# Patient Record
Sex: MALE | Race: WHITE | URBAN - METROPOLITAN AREA
[De-identification: names, ages, dates, MRNs, and addresses within clinical notes are randomized per-mention and may not be internally consistent; named-entity substitution may affect disease eponyms.]

---

## 2021-08-28 ENCOUNTER — TELEPHONE ENCOUNTER (OUTPATIENT)
Dept: URBAN - METROPOLITAN AREA CLINIC 13 | Facility: CLINIC | Age: 76
End: 2021-08-28

## 2021-08-29 ENCOUNTER — TELEPHONE ENCOUNTER (OUTPATIENT)
Dept: URBAN - METROPOLITAN AREA CLINIC 13 | Facility: CLINIC | Age: 76
End: 2021-08-29

## 2023-11-30 ENCOUNTER — LAB OUTSIDE AN ENCOUNTER (OUTPATIENT)
Dept: URBAN - METROPOLITAN AREA CLINIC 115 | Facility: CLINIC | Age: 78
End: 2023-11-30

## 2023-11-30 ENCOUNTER — OFFICE VISIT (OUTPATIENT)
Dept: URBAN - METROPOLITAN AREA CLINIC 115 | Facility: CLINIC | Age: 78
End: 2023-11-30
Payer: MEDICARE

## 2023-11-30 VITALS
DIASTOLIC BLOOD PRESSURE: 64 MMHG | SYSTOLIC BLOOD PRESSURE: 103 MMHG | BODY MASS INDEX: 26.28 KG/M2 | HEART RATE: 52 BPM | WEIGHT: 177.4 LBS | HEIGHT: 69 IN | TEMPERATURE: 97.3 F

## 2023-11-30 DIAGNOSIS — K85.80 OTHER ACUTE PANCREATITIS, UNSPECIFIED COMPLICATION STATUS: ICD-10-CM

## 2023-11-30 DIAGNOSIS — K21.9 GASTROESOPHAGEAL REFLUX DISEASE, UNSPECIFIED WHETHER ESOPHAGITIS PRESENT: ICD-10-CM

## 2023-11-30 PROBLEM — 235595009: Status: ACTIVE | Noted: 2023-11-30

## 2023-11-30 PROCEDURE — 99204 OFFICE O/P NEW MOD 45 MIN: CPT | Performed by: INTERNAL MEDICINE

## 2023-11-30 RX ORDER — PANTOPRAZOLE SODIUM 40 MG/1
1 TABLET TABLET, DELAYED RELEASE ORAL ONCE A DAY
Qty: 30 TABLET | Refills: 1 | OUTPATIENT
Start: 2023-11-30

## 2023-11-30 NOTE — HPI-TODAY'S VISIT:
11/30/2023 Patient is a 78 year old  male who presents for acute abdominal pain. Patient states that he was at the hospital in [facility] and that he was there for pancreatitis. Patient states that the pain is much better, but that he has a sensation of fullness. He still has his gall bladder and states that he does have cholesterol issues and is on medications. Patient states that sometimes he has some tenderness when he eats. He denies being heavy drinker, but states that he drinks an alcohol drink three times a week. 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 He denies being on any blood thinners, only baby aspirin. He states that he has been on nexium for a long time.

## 2024-01-30 ENCOUNTER — LAB OUTSIDE AN ENCOUNTER (OUTPATIENT)
Dept: URBAN - METROPOLITAN AREA CLINIC 82 | Facility: CLINIC | Age: 79
End: 2024-01-30

## 2024-01-30 ENCOUNTER — OFFICE VISIT (OUTPATIENT)
Dept: URBAN - METROPOLITAN AREA MEDICAL CENTER 24 | Facility: MEDICAL CENTER | Age: 79
End: 2024-01-30
Payer: MEDICARE

## 2024-01-30 ENCOUNTER — TELEPHONE ENCOUNTER (OUTPATIENT)
Dept: URBAN - METROPOLITAN AREA CLINIC 82 | Facility: CLINIC | Age: 79
End: 2024-01-30

## 2024-01-30 DIAGNOSIS — K82.8 ADENOMYOSIS OF GALLBLADDER: ICD-10-CM

## 2024-01-30 PROCEDURE — 43237 ENDOSCOPIC US EXAM ESOPH: CPT | Performed by: INTERNAL MEDICINE

## 2024-01-30 RX ORDER — PANTOPRAZOLE SODIUM 40 MG/1
1 TABLET TABLET, DELAYED RELEASE ORAL ONCE A DAY
Qty: 30 TABLET | Refills: 1 | Status: ACTIVE | COMMUNITY
Start: 2023-11-30

## 2024-03-28 ENCOUNTER — OV EP (OUTPATIENT)
Dept: URBAN - METROPOLITAN AREA CLINIC 115 | Facility: CLINIC | Age: 79
End: 2024-03-28
Payer: MEDICARE

## 2024-03-28 VITALS
TEMPERATURE: 98 F | DIASTOLIC BLOOD PRESSURE: 72 MMHG | HEIGHT: 69 IN | WEIGHT: 184.8 LBS | SYSTOLIC BLOOD PRESSURE: 120 MMHG | BODY MASS INDEX: 27.37 KG/M2 | HEART RATE: 52 BPM

## 2024-03-28 DIAGNOSIS — K21.9 GASTROESOPHAGEAL REFLUX DISEASE, UNSPECIFIED WHETHER ESOPHAGITIS PRESENT: ICD-10-CM

## 2024-03-28 DIAGNOSIS — Z95.1 STATUS POST CARDIAC REVASCULARIZATION WITH BYPASS AORTOCORONARY ANASTOMOSIS OF FIVE CORONARY VESSELS: ICD-10-CM

## 2024-03-28 DIAGNOSIS — K85.80 OTHER ACUTE PANCREATITIS, UNSPECIFIED COMPLICATION STATUS: ICD-10-CM

## 2024-03-28 DIAGNOSIS — E78.2 MIXED HYPERLIPIDEMIA: ICD-10-CM

## 2024-03-28 PROBLEM — 267434003: Status: ACTIVE | Noted: 2024-03-28

## 2024-03-28 PROCEDURE — 99214 OFFICE O/P EST MOD 30 MIN: CPT | Performed by: INTERNAL MEDICINE

## 2024-03-28 RX ORDER — PANTOPRAZOLE SODIUM 40 MG/1
1 TABLET TABLET, DELAYED RELEASE ORAL ONCE A DAY
Qty: 30 TABLET | Refills: 1 | Status: ACTIVE | COMMUNITY
Start: 2023-11-30

## 2024-03-28 NOTE — HPI-TODAY'S VISIT:
11/30/2023 Patient is a 78 year old  male who presents for acute abdominal pain. Patient states that he was at the hospital in [facility] and that he was there for pancreatitis. Patient states that the pain is much better, but that he has a sensation of fullness. He still has his gall bladder and states that he does have cholesterol issues and is on medications. Patient states that sometimes he has some tenderness when he eats. He denies being heavy drinker, but states that he drinks an alcohol drink three times a week. 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 He denies being on any blood thinners, only baby aspirin. He states that he has been on nexium for a long time.  3/28/2024 Patient presents for a follow up. Patient denies anymore questions with his stomach. He states that usually he has one alcoholic drink a night. He denies any new symptoms. Patient is on crestor for cholesterol. He states pantoprazole did not help so he is on nexium 20 mg bid.

## 2024-08-29 ENCOUNTER — OFFICE VISIT (OUTPATIENT)
Dept: URBAN - METROPOLITAN AREA CLINIC 115 | Facility: CLINIC | Age: 79
End: 2024-08-29

## 2024-09-28 ENCOUNTER — LAB OUTSIDE AN ENCOUNTER (OUTPATIENT)
Dept: URBAN - METROPOLITAN AREA CLINIC 115 | Facility: CLINIC | Age: 79
End: 2024-09-28

## 2024-11-25 ENCOUNTER — DASHBOARD ENCOUNTERS (OUTPATIENT)
Age: 79
End: 2024-11-25

## 2024-11-26 ENCOUNTER — OFFICE VISIT (OUTPATIENT)
Dept: URBAN - METROPOLITAN AREA CLINIC 48 | Facility: CLINIC | Age: 79
End: 2024-11-26
Payer: MEDICARE

## 2024-11-26 VITALS
DIASTOLIC BLOOD PRESSURE: 63 MMHG | TEMPERATURE: 97.3 F | WEIGHT: 178.4 LBS | BODY MASS INDEX: 26.42 KG/M2 | SYSTOLIC BLOOD PRESSURE: 108 MMHG | HEART RATE: 56 BPM | HEIGHT: 69 IN

## 2024-11-26 DIAGNOSIS — K82.8 DYSKINESIA OF GALLBLADDER: ICD-10-CM

## 2024-11-26 DIAGNOSIS — Z87.19 HISTORY OF PANCREATITIS: ICD-10-CM

## 2024-11-26 DIAGNOSIS — K21.9 GASTROESOPHAGEAL REFLUX DISEASE, UNSPECIFIED WHETHER ESOPHAGITIS PRESENT: ICD-10-CM

## 2024-11-26 PROCEDURE — 99214 OFFICE O/P EST MOD 30 MIN: CPT

## 2024-11-26 RX ORDER — ESOMEPRAZOLE MAGNESIUM 40 MG/1
1 CAPSULE 1/2 TO 1 HOUR BEFORE MORNING MEAL CAPSULE, DELAYED RELEASE ORAL ONCE A DAY
Status: ACTIVE | COMMUNITY

## 2024-11-26 RX ORDER — ESOMEPRAZOLE MAGNESIUM 40 MG/1
1 CAPSULE CAPSULE, DELAYED RELEASE PELLETS ORAL ONCE A DAY
Qty: 90 CAPSULE | Refills: 3 | OUTPATIENT
Start: 2024-11-26

## 2024-11-26 RX ORDER — DICYCLOMINE HYDROCHLORIDE 20 MG/1
1 TABLET TABLET ORAL THREE TIMES A DAY
Status: ACTIVE | COMMUNITY

## 2024-11-26 RX ORDER — ASPIRIN 81 MG/1
1 TABLET TABLET, COATED ORAL ONCE A DAY
Status: ACTIVE | COMMUNITY

## 2024-11-26 RX ORDER — DICYCLOMINE HYDROCHLORIDE 20 MG/1
1 TABLET TABLET ORAL THREE TIMES A DAY
Qty: 90 | Refills: 3 | OUTPATIENT
Start: 2024-11-26 | End: 2025-03-26

## 2024-11-26 RX ORDER — ROSUVASTATIN CALCIUM 20 MG/1
1 TABLET TABLET, FILM COATED ORAL ONCE A DAY
Status: ACTIVE | COMMUNITY

## 2024-11-26 NOTE — HPI-TODAY'S VISIT:
79-year-old male presents for evaluation pancreatitis.  Previously seen in [place] office 3/2024 for history of pancreatitis, for which patient was previously hospitalized at [facility]. At this visit, patient reported some epigastric tenderness postprandially; pantoprazole 40mg did not help symptoms, and he was taking Nexium 20 mg twice daily with relief. Patient had EUS 1/30/2024 which showed hyperechoic material consistent with sludge in gallbladder, suspected pancreatic divisum in uncinate process; ordered MRCP to rule out pancreatic divisum. If evidence of divisum on MRCP, proceed with ERCP. Otherwise, plan was to refer for cholecystectomy to prevent recurrent episodes. MRCP showed no evidence of pancreatitis, and no ductal abnormalities or choledocholithiasis. Of note, MRCP was done without contrast. Imaging also showed hepatic steatosis. He states he previously had HIDA scan with gallbladder EF in the 20's. Currently, patient reports RUQ "aching" which occurs constantly. He has episodes of mild nausea which occur every several weeks, but denies vomiting, heartburn/indigestion. He is on Nexium with good control of reflux. Patient denies dysphagia, melena, weight loss. He drinks 1 glass of wine per week, and denies history of heavy alcohol use. He was previously off Crestor with cholesterol increased to 180's, and is now taking his statin every other day. Denies steatorrhea, diarrhea, or abdominal bloating. Patient has history of quadruple bypass several years ago. He had bloodwork with Dr. Rodriguez a couple months ago which he states was normal. Blood sugar has been well-controlled.

## 2025-05-12 ENCOUNTER — ERX REFILL RESPONSE (OUTPATIENT)
Dept: URBAN - METROPOLITAN AREA CLINIC 48 | Facility: CLINIC | Age: 80
End: 2025-05-12

## 2025-05-12 RX ORDER — DICYCLOMINE HYDROCHLORIDE 20 MG/1
TAKE ONE TABLET BY MOUTH THREE TIMES A DAY TABLET ORAL
Qty: 90 TABLET | Refills: 3 | OUTPATIENT

## 2025-05-12 RX ORDER — DICYCLOMINE HYDROCHLORIDE 20 MG/1
1 TABLET TABLET ORAL THREE TIMES A DAY
OUTPATIENT